# Patient Record
Sex: FEMALE | Race: WHITE | NOT HISPANIC OR LATINO | Employment: PART TIME | URBAN - METROPOLITAN AREA
[De-identification: names, ages, dates, MRNs, and addresses within clinical notes are randomized per-mention and may not be internally consistent; named-entity substitution may affect disease eponyms.]

---
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
## 2024-04-28 ENCOUNTER — HOSPITAL ENCOUNTER (EMERGENCY)
Facility: HOSPITAL | Age: 19
Discharge: HOME | End: 2024-04-28
Payer: COMMERCIAL

## 2024-04-28 VITALS
TEMPERATURE: 98.9 F | HEIGHT: 63 IN | OXYGEN SATURATION: 97 % | WEIGHT: 241.8 LBS | SYSTOLIC BLOOD PRESSURE: 120 MMHG | BODY MASS INDEX: 42.84 KG/M2 | RESPIRATION RATE: 18 BRPM | DIASTOLIC BLOOD PRESSURE: 90 MMHG | HEART RATE: 84 BPM

## 2024-04-28 DIAGNOSIS — R21 RASH: ICD-10-CM

## 2024-04-28 PROCEDURE — 250N000013 HC RX MED GY IP 250 OP 250 PS 637

## 2024-04-28 PROCEDURE — 99284 EMERGENCY DEPT VISIT MOD MDM: CPT

## 2024-04-28 RX ORDER — CETIRIZINE HYDROCHLORIDE 10 MG/1
10 TABLET ORAL ONCE
Status: COMPLETED | OUTPATIENT
Start: 2024-04-28 | End: 2024-04-28

## 2024-04-28 RX ORDER — HYDROXYZINE HYDROCHLORIDE 25 MG/1
25 TABLET, FILM COATED ORAL 3 TIMES DAILY PRN
Qty: 6 TABLET | Refills: 0 | Status: SHIPPED | OUTPATIENT
Start: 2024-04-28

## 2024-04-28 RX ORDER — CETIRIZINE HYDROCHLORIDE 10 MG/1
10 TABLET ORAL DAILY
Qty: 10 TABLET | Refills: 0 | Status: SHIPPED | OUTPATIENT
Start: 2024-04-28 | End: 2024-05-08

## 2024-04-28 RX ORDER — HYDROXYZINE HYDROCHLORIDE 25 MG/1
25 TABLET, FILM COATED ORAL ONCE
Status: COMPLETED | OUTPATIENT
Start: 2024-04-28 | End: 2024-04-28

## 2024-04-28 RX ADMIN — HYDROXYZINE HYDROCHLORIDE 25 MG: 25 TABLET ORAL at 22:32

## 2024-04-28 RX ADMIN — CETIRIZINE HYDROCHLORIDE 10 MG: 10 TABLET ORAL at 21:39

## 2024-04-28 ASSESSMENT — ACTIVITIES OF DAILY LIVING (ADL)
ADLS_ACUITY_SCORE: 33
ADLS_ACUITY_SCORE: 33

## 2024-04-28 ASSESSMENT — COLUMBIA-SUICIDE SEVERITY RATING SCALE - C-SSRS
2. HAVE YOU ACTUALLY HAD ANY THOUGHTS OF KILLING YOURSELF IN THE PAST MONTH?: NO
1. IN THE PAST MONTH, HAVE YOU WISHED YOU WERE DEAD OR WISHED YOU COULD GO TO SLEEP AND NOT WAKE UP?: NO
6. HAVE YOU EVER DONE ANYTHING, STARTED TO DO ANYTHING, OR PREPARED TO DO ANYTHING TO END YOUR LIFE?: NO

## 2024-04-29 NOTE — ED TRIAGE NOTES
Patient woke up this morning with a slight rash. Patient reports that she went to work and the rash increased in intensity. Pruritic rash noted to arms, torso. Patient denies new detergents, soaps. Patient did take one Benadryl prior to arrival and additionally used cortisone cream.     Triage Assessment (Adult)       Row Name 04/28/24 2011          Triage Assessment    Airway WDL WDL        Respiratory WDL    Respiratory WDL WDL        Skin Circulation/Temperature WDL    Skin Circulation/Temperature WDL X  rash, diffuse over torso and arms        Cardiac WDL    Cardiac WDL WDL        Peripheral/Neurovascular WDL    Peripheral Neurovascular WDL WDL        Cognitive/Neuro/Behavioral WDL    Cognitive/Neuro/Behavioral WDL WDL

## 2024-04-29 NOTE — DISCHARGE INSTRUCTIONS
You were seen in the Emergency Department today for a skin rash. We do not have tests here to determine what is causing this.    You were treated with zyrtec. This is a medication similar to benadryl and works to help stop the allergic process. You can take continue to take the Zyrtec daily to help preventyour symptoms from coming back. This will not make you as sleepy as benadryl.  I have sent you with a prescription for this but you can also pick it up over the counter.  I also provided a prescription for hydroxyzine, medication that he can take as needed for itching.  This medication will make you feel sleepy, do not take this medication and drive operate heavy machinery, drink alcohol, or take other medications that make you feel sleepy.  Do not take this medication at the same time or close to when you take Benadryl.      If you begin to experience shortness of breath, difficulty breathing, difficulty swallowing, worsening swelling, or hives, you should return to the ER immediately. Otherwise, please follow up with your primary physician within the next 2-3 days for recheck andongoing management.     Below is some information that you might find informative and useful.

## 2024-04-29 NOTE — ED PROVIDER NOTES
EMERGENCY DEPARTMENT ENCOUNTER      NAME: Christal Reyes  AGE: 19 year old female  YOB: 2005  MRN: [id number]  EVALUATION DATE & TIME: 04/28/24 8:10 PM    PCP: [facility]    ED PROVIDER: Julia Martins PA-C      CHIEF COMPLAINT:  Rash      FINAL IMPRESSION:  1. Rash          ED COURSE & MEDICAL DECISION MAKING:  Pertinent Labs & Imaging studies reviewed. (See chart for details)    The patient is an otherwise healthy 19 year old-year-old female presenting to the emergency department for evaluation of urticaria to her abdomen, chest, and bilateral arms onset this morning around 8 am. No lip, tongue, throat swelling, fevers, chills, difficulty breathing. No identified triggers, did start Wellbutrin several weeks ago, took for 5 days and then self discontinued. She took 2 tablets of benadryl and applied topical hydrocortisone cream with no relief. No fever, chills, or arthralgias. Denies history of travel or exposure to ticks or insect bites. Patient denies history of atopy.     Vitals reviewed and significant for elevated blood pressure, vital signs otherwise within normal limits. Repeat blood pressure improved. On exam patient is alert and non toxic appearing. She headache scattered erythematous wheals to her abdomen, chest, and bilateral arms appears most  consistent with urticaria. No oral lesions. Lesions shruti with pressure, no petechiae/purpura.. Palms and soles spared.    I considered a broad differential including atopic dermatitis, contact dermatitis, drug reaction, viral exanthem. No evidence of mucous membrane involvement, negative Nikolsky, and no systemic symptoms that would be consistent with SJS/TEN, bullous pemphigoid, or pemphigus vulgaris. No desquamation or systemic symptoms present to indicate TSS. No bullae, crepitus, or pain out of proportion to indicate necrotizing fasciitis.  Patient denies tick exposure and travel, minimal concern for  Lyme disease and RMSF. Does not follow dermatomal distribution, minimal concern for herpes zoster. Blanches with pressure, minimal concern for petechial rashes from thrombocytopenia or rickettsial infections. I have very low suspicion for anaphylaxis in the absence of dyspnea, wheezing, abdominal pain and with stable vital signs.       Overall, I do feel history and presentation is very consistent with urticaria and/or mild allergic reaction, although of unknown etiology.  The patient  is having no difficulty breathing or managing secretions and has no appreciable lip or tongue swelling and has an overall very benign reassuring exam.  No systemic signs or symptoms of infection or illness.  At this point, I see no indication for laboratory or imaging work-up.  Discussed options for management with the patient.  Will plan to try a dose of Zyrtec and hydroxyzine and discharge with prescriptions.  Patient comfortable with plan.    Patient was given a dose of Zyrtec and hydroxyzine which she tolerated well.  She had no recurrence or worsening of symptoms while in the department.  She remained hemodynamically stable and was eager and ready to go home, comfortable with plan.     Recommended she hold on restarting Wellbutrin until she follows up with primary care and the prescribing provider. Symptomatic home cares discussed. Emphasized importance of follow up with primary care clinician. Strict return precautions discussed.     At the conclusion of the encounter I discussed the results of all of the tests and the disposition. The questions were answered. The patient or family acknowledged understanding and was agreeable with the care plan.       ED COURSE:  9:29 PM  I met and introduced myself to the patient. I gathered initial history and performed an initial physical exam. We discussed options and plan for diagnostics and treatment here in the ED.  10:22 PM I updated the patient on their results and plan and re-evaluated  them.  I discussed the plan for discharge with the patient, and patient is agreeable. We discussed supportive cares at home and reasons for return to the ER including new or worsening symptoms - all questions and concerns addressed to the best of my ability. Strict return precautions discussed. Patient to be discharged by RN.      Medical Decision Making  Obtained supplemental history:Supplemental history obtained?: No  Reviewed external records: External records reviewed?: No  Care impacted by chronic illness:Other: disease of liver, s/p cholecystectomy  Care significantly affected by social determinants of health:N/A  Did you consider but not order tests?: Work up considered but not performed and documented in chart, if applicable  Did you interpret images independently?: Independent interpretation of ECG and images noted in documentation, when applicable.  Consultation discussion with other provider:Did you involve another provider (consultant, MH, pharmacy, etc.)?: No  Discharge. I prescribed additional prescription strength medication(s) as charted. See documentation for any additional details.      CRITICAL CARE:  Not applicable      MEDICATIONS GIVEN IN THE EMERGENCY:  Medications   dexAMETHasone (DECADRON) tablet 10 mg (has no administration in time range)   hydrOXYzine HCl (ATARAX) tablet 25 mg (has no administration in time range)   cetirizine (zyrTEC) tablet 10 mg (10 mg Oral $Given 4/28/24 2139)       NEW PRESCRIPTIONS STARTED AT TODAY'S ER VISIT  Current Discharge Medication List        START taking these medications    Details   cetirizine (ZYRTEC) 10 MG tablet Take 1 tablet (10 mg) by mouth daily for 10 days  Qty: 10 tablet, Refills: 0                =================================================================    HPI    Patient information was obtained from: patient     Use of Intrepreter: N/A      Christal Reyes is a 19 year old female who presents to the emergency department for  evaluation of rash.    Patient reports she noticed a red rash on her abdomen that spread to her chest, neck, and arms today ~8 AM. This has worsened throughout the day and is now burning and itchy. Took 2 benadryl at 7 PM and put hydrocortisone creme on it without any improvement. Denies history of symptoms or eczema. Denies contact with people with similar symptoms. Denies new food, pets, detergent, soap, lotion, or clothes. Denies recent travel or tick bites. Denies STD concern. Denies lip, tongue, throat swelling, shortness of breath, urinary symptoms, fever, chills.     Patient started Wellbutrin on 4/11 which she took for 5 days then stopped.     Patient has been recently sick with congestion, sore throat, and cough. Her symptoms have since improved.      PAST MEDICAL HISTORY:  Past Medical History:   Diagnosis Date    Gastric reflux        PAST SURGICAL HISTORY:  Past Surgical History:   Procedure Laterality Date    CHOLECYSTECTOMY      Removal at [facility] on 1/11/19       CURRENT MEDICATIONS:    Prior to Admission Medications   Prescriptions Last Dose Informant Patient Reported? Taking?   hydrocortisone (CORTAID) 1 % external cream   No No   Sig: Apply topically 2 times daily   norgestimate-ethinyl estradiol (ORTHO-CYCLEN) 0.25-35 MG-MCG tablet   No No   Sig: Take 1 tablet by mouth daily   omeprazole (PRILOSEC) 20 MG DR capsule   No No   Sig: Take 1 capsule (20 mg) by mouth daily   ranitidine (ZANTAC) 150 MG tablet   No No   Sig: Take 1 tablet (150 mg) by mouth 2 times daily      Facility-Administered Medications: None       ALLERGIES:  Allergies   Allergen Reactions    No Known Allergies        FAMILY HISTORY:  Family History   Problem Relation Age of Onset    Pulmonary Embolism Mother     Cerebrovascular Disease Paternal Grandmother     Cerebrovascular Disease Niece     Other Cancer Other         lung    Diabetes No family hx of     Coronary Artery Disease No family hx of     Hypertension No family hx of   "   Asthma No family hx of     Thyroid Disease No family hx of        SOCIAL HISTORY:  Social History     Tobacco Use    Smoking status: Never    Smokeless tobacco: Never    Tobacco comments:     exposed to second hand smoke        VITALS:    First Vitals:  Patient Vitals for the past 24 hrs:   BP Temp Temp src Pulse Resp SpO2 Height Weight   04/28/24 2008 (!) 137/96 98.9  F (37.2  C) Temporal 98 18 98 % 1.6 m (5' 3\") 109.7 kg (241 lb 12.8 oz)       Patient Vitals for the past 24 hrs:   BP Temp Temp src Pulse Resp SpO2 Height Weight   04/28/24 2008 (!) 137/96 98.9  F (37.2  C) Temporal 98 18 98 % 1.6 m (5' 3\") 109.7 kg (241 lb 12.8 oz)         PHYSICAL EXAM  VITAL SIGNS: BP (!) 137/96   Pulse 98   Temp 98.9  F (37.2  C) (Temporal)   Resp 18   Ht 1.6 m (5' 3\")   Wt 109.7 kg (241 lb 12.8 oz)   SpO2 98%   BMI 42.83 kg/m     GENERAL: Awake, alert, answering questions appropriately, in no acute distress.  HEENT: Moist mucous membranes. Posterior oropharynx clear with no erythema, no exudate. No tonsillar hypertrophy. Uvula midline. Tolerating secretions, no drooling. No oral lesions. No throat, tongue, or lip swelling. No nuchal rigidity, ranging neck freely.    SPEECH:  Easy to understand speech, Normal volume and alyse. Normal phonation.  PULMONARY: No respiratory distress, Breathing comfortably on room air. Lungs clear to auscultation bilaterally.  CARDIOVASCULAR: Regular rate and rhythm, radial pulses present, symmetric, and normal.  ABDOMINAL: Soft, Nondistended, Nontender, No rebound or guarding. Bowel sounds present.  EXTREMITIES: Extremities are warm and well perfused.    NEUROLOGIC: Moving all extremities spontaneously.   SKIN: Exposed areas of skin warm, dry. Scattered blanchable erythematous wheals to abdomen, chest, bilateral arms. No increased warmth, fluctuance, purulent drainage, crepitus, pain out of proportion, or streaking erythema.  PSYCH: Normal mood and affect.         "   RADIOLOGY/LAB:  Reviewed all pertinent imaging. Please see official radiology report.  All pertinent labs reviewed and interpreted.         EKG:  None      PROCEDURES:  None        I, Marcie Hassan, am serving as a scribe to document services personally performed by Julia Martins PA-C, based on my observation and the provider's statements to me. I, Julia Martins PA-C attest that Marcie Hassan is acting in a scribe capacity, has observed my performance of the services and has documented them in accordance with my direction.         Julia Martins PA-C  Emergency Medicine  [facility] EMERGENCY DEPARTMENT  [facility] [place] [id number] [phone]  Dept: [phone]     Julia Martins PA-C  05/17/24 3446

## 2024-06-23 ENCOUNTER — HEALTH MAINTENANCE LETTER (OUTPATIENT)
Age: 19
End: 2024-06-23

## 2025-05-01 ENCOUNTER — HOSPITAL ENCOUNTER (EMERGENCY)
Facility: HOSPITAL | Age: 20
Discharge: HOME | End: 2025-05-01
Attending: STUDENT IN AN ORGANIZED HEALTH CARE EDUCATION/TRAINING PROGRAM
Payer: COMMERCIAL

## 2025-05-01 VITALS
SYSTOLIC BLOOD PRESSURE: 157 MMHG | DIASTOLIC BLOOD PRESSURE: 93 MMHG | TEMPERATURE: 98.9 F | OXYGEN SATURATION: 98 % | RESPIRATION RATE: 16 BRPM | WEIGHT: 241 LBS | BODY MASS INDEX: 42.69 KG/M2 | HEART RATE: 87 BPM

## 2025-05-01 DIAGNOSIS — L08.9 SOFT TISSUE INFECTION: ICD-10-CM

## 2025-05-01 PROCEDURE — 99283 EMERGENCY DEPT VISIT LOW MDM: CPT

## 2025-05-01 RX ORDER — SULFAMETHOXAZOLE AND TRIMETHOPRIM 800; 160 MG/1; MG/1
1 TABLET ORAL 2 TIMES DAILY
Qty: 14 TABLET | Refills: 0 | Status: SHIPPED | OUTPATIENT
Start: 2025-05-01 | End: 2025-05-08

## 2025-05-01 ASSESSMENT — COLUMBIA-SUICIDE SEVERITY RATING SCALE - C-SSRS
6. HAVE YOU EVER DONE ANYTHING, STARTED TO DO ANYTHING, OR PREPARED TO DO ANYTHING TO END YOUR LIFE?: NO
1. IN THE PAST MONTH, HAVE YOU WISHED YOU WERE DEAD OR WISHED YOU COULD GO TO SLEEP AND NOT WAKE UP?: NO
2. HAVE YOU ACTUALLY HAD ANY THOUGHTS OF KILLING YOURSELF IN THE PAST MONTH?: NO

## 2025-05-01 NOTE — Clinical Note
Christal Reyes was seen and treated in our emergency department on 5/1/2025.  She may return to work on 05/03/2025.       If you have any questions or concerns, please don't hesitate to call.      Andrey Wolf MD

## 2025-05-02 NOTE — DISCHARGE INSTRUCTIONS
Continue to treat symptoms using Tylenol, Motrin.  We are prescribing you antibiotics, which you should take as directed.    Please return to the ER if symptoms worsen, if you develop fever, spreading redness, or if pain is significantly worsening despite the use of over-the-counter medications.

## 2025-05-02 NOTE — ED TRIAGE NOTES
Pt caught her hand on a nail on Saturday.  Pt put neosporin on the wound.  Pt states that after taking of the bandaid she had a small rash.  The rash has gradually frown bigger.  Area is about 1.5 inches x1.5 inches.  Pt took benadryl around 10 am today.       Triage Assessment (Adult)       Row Name 05/01/25 2012          Triage Assessment    Airway WDL WDL        Respiratory WDL    Respiratory WDL WDL        Skin Circulation/Temperature WDL    Skin Circulation/Temperature WDL X  rash to right back of hand

## 2025-05-02 NOTE — ED PROVIDER NOTES
EMERGENCY DEPARTMENT ENCOUNTER      NAME: Christal Reyes  AGE: 20 year old female  YOB: 2005  MRN: [id number]  EVALUATION DATE & TIME: No admission date for patient encounter.    PCP: Hermes Hernández Wayne    ED PROVIDER: Andrey Wolf MD      Chief Complaint   Patient presents with    Rash         FINAL IMPRESSION:  1. Soft tissue infection          ED COURSE & MEDICAL DECISION MAKING:    Pertinent Labs & Imaging studies reviewed. (See chart for details)  20 year old female presents to the Emergency Department for evaluation of rash    ED Course as of 05/01/25 2204   Thu May 01, 2025   2131 Patient is a 20-year-old female who presents to the emergency department with a rash on the dorsum of her right hand.  She cut her hand on a nail 5 days ago, put Neosporin on the wound, and has had a rash there that has been gradually growing since the bandage was removed.  It is itchy, mildly painful.  On exam there is blanching erythema with scattered tiny pustules, but no organized fluid collection.  She has full range of motion, sensation, strength, capillary refill.  No crepitus.  Concern for soft tissue infection.  Will start the patient on Bactrim.  Patient declined getting first dose in the ER and wanted prescription to be filled at a pharmacy [facility], sent with prescription.  Discussed return precautions and symptomatic cares and encouraged follow-up with PCP on Monday for wound reevaluation.       Medical Decision Making      Discharge. I prescribed additional prescription strength medication(s) as charted. See documentation for any additional details.    MIPS (CTPE, Dental pain, Romero, Sinusitis, Asthma/COPD, Head Trauma): Not Applicable    SEPSIS: None            At the conclusion of the encounter I discussed the results of all of the tests and the disposition. The questions were answered. The patient or family acknowledged understanding and was agreeable with the care  plan.     0 minutes of critical care time     MEDICATIONS GIVEN IN THE EMERGENCY:  Medications - No data to display    NEW PRESCRIPTIONS STARTED AT TODAY'S ER VISIT  Discharge Medication List as of 5/1/2025  9:38 PM        START taking these medications    Details   sulfamethoxazole-trimethoprim (BACTRIM DS) 800-160 MG tablet Take 1 tablet by mouth 2 times daily for 7 days., Disp-14 tablet, R-0, Local Print                =================================================================    HPI    Patient information was obtained from: patient     Use of : N/A        Christal JAFFE Cate Reyes is a 20 year old female with no pertinent history who presents to this ED by private vehicle with brother for evaluation of rash.    Patient reports cutting the top of her right hand on a construction nail on 4/26. States it healed up but now there is a swollen rash that has been growing. She also endorses some intermittent itching and burning, so she took Benadryl around 10:00 AM today. Denies fever. Denies rash elsewhere. There were no other concerns/complaints at this time.       PAST MEDICAL HISTORY:  Past Medical History:   Diagnosis Date    Gastric reflux        PAST SURGICAL HISTORY:  Past Surgical History:   Procedure Laterality Date    CHOLECYSTECTOMY      Removal at [facility] on 1/11/19           CURRENT MEDICATIONS:    sulfamethoxazole-trimethoprim (BACTRIM DS) 800-160 MG tablet  hydrocortisone (CORTAID) 1 % external cream  hydrOXYzine HCl (ATARAX) 25 MG tablet  norgestimate-ethinyl estradiol (ORTHO-CYCLEN) 0.25-35 MG-MCG tablet  omeprazole (PRILOSEC) 20 MG DR capsule  ranitidine (ZANTAC) 150 MG tablet        ALLERGIES:  Allergies   Allergen Reactions    No Known Allergies        FAMILY HISTORY:  Family History   Problem Relation Age of Onset    Pulmonary Embolism Mother     Cerebrovascular Disease Paternal Grandmother     Cerebrovascular Disease Niece     Other Cancer Other         lung    Diabetes No family  hx of     Coronary Artery Disease No family hx of     Hypertension No family hx of     Asthma No family hx of     Thyroid Disease No family hx of        SOCIAL HISTORY:   Social History     Socioeconomic History    Marital status: Single   Tobacco Use    Smoking status: Never    Smokeless tobacco: Never    Tobacco comments:     exposed to second hand smoke       VITALS:  BP (!) 157/93   Pulse 87   Temp 98.9  F (37.2  C) (Oral)   Resp 16   Wt 109.3 kg (241 lb)   SpO2 98%   BMI 42.69 kg/m      PHYSICAL EXAM    Physical Exam  Vitals and nursing note reviewed.   Constitutional:       General: She is not in acute distress.     Appearance: Normal appearance. She is normal weight.   HENT:      Head: Normocephalic and atraumatic.      Nose: Nose normal.      Mouth/Throat:      Mouth: Mucous membranes are moist.   Eyes:      Conjunctiva/sclera: Conjunctivae normal.   Cardiovascular:      Rate and Rhythm: Normal rate.      Pulses: Normal pulses.   Pulmonary:      Effort: Pulmonary effort is normal.   Abdominal:      General: Abdomen is flat.   Musculoskeletal:         General: Normal range of motion.      Cervical back: Normal range of motion.   Skin:     General: Skin is warm and dry.      Capillary Refill: Capillary refill takes less than 2 seconds.      Findings: Rash (2 x 2 cm area of erythema with tiny pustules on the dorsum of the right hand.  Blanching erythema.  Minimal tenderness.  No crepitus.  Normal range of motion distally.) present.   Neurological:      General: No focal deficit present.      Mental Status: She is alert and oriented to person, place, and time. Mental status is at baseline.   Psychiatric:         Mood and Affect: Mood normal.         Behavior: Behavior normal.         Thought Content: Thought content normal.         Judgment: Judgment normal.            LAB:  All pertinent labs reviewed and interpreted.       RADIOLOGY:  Reviewed all pertinent imaging. Please see official radiology  report.  No orders to display       PROCEDURES:   None      [employer] Documentation:   CMS Diagnoses: None             I, Lucia Jose, am serving as a scribe to document services personally performed by Andrey Wolf MD based on my observation and the provider's statements to me. I, Andrey Wolf MD, attest that Lucia Jose is acting in a scribe capacity, has observed my performance of the services and has documented them in accordance with my direction.    Andrey Wolf MD  [facility] EMERGENCY DEPARTMENT  [address] [place] [id number]  [phone]       Andrey Wolf MD  05/01/25 0040

## 2025-07-12 ENCOUNTER — HEALTH MAINTENANCE LETTER (OUTPATIENT)
Age: 20
End: 2025-07-12